# Patient Record
Sex: FEMALE | Race: WHITE | NOT HISPANIC OR LATINO
[De-identification: names, ages, dates, MRNs, and addresses within clinical notes are randomized per-mention and may not be internally consistent; named-entity substitution may affect disease eponyms.]

---

## 2021-07-29 PROBLEM — Z00.00 ENCOUNTER FOR PREVENTIVE HEALTH EXAMINATION: Status: ACTIVE | Noted: 2021-07-29

## 2021-08-04 ENCOUNTER — NON-APPOINTMENT (OUTPATIENT)
Age: 61
End: 2021-08-04

## 2021-08-04 ENCOUNTER — APPOINTMENT (OUTPATIENT)
Dept: ORTHOPEDIC SURGERY | Facility: CLINIC | Age: 61
End: 2021-08-04
Payer: COMMERCIAL

## 2021-08-04 VITALS — TEMPERATURE: 97.7 F

## 2021-08-04 DIAGNOSIS — Z86.79 PERSONAL HISTORY OF OTHER DISEASES OF THE CIRCULATORY SYSTEM: ICD-10-CM

## 2021-08-04 DIAGNOSIS — M17.12 UNILATERAL PRIMARY OSTEOARTHRITIS, LEFT KNEE: ICD-10-CM

## 2021-08-04 DIAGNOSIS — Z78.9 OTHER SPECIFIED HEALTH STATUS: ICD-10-CM

## 2021-08-04 DIAGNOSIS — M21.162 VARUS DEFORMITY, NOT ELSEWHERE CLASSIFIED, RIGHT KNEE: ICD-10-CM

## 2021-08-04 DIAGNOSIS — M21.161 VARUS DEFORMITY, NOT ELSEWHERE CLASSIFIED, RIGHT KNEE: ICD-10-CM

## 2021-08-04 DIAGNOSIS — M25.562 PAIN IN LEFT KNEE: ICD-10-CM

## 2021-08-04 DIAGNOSIS — M17.11 UNILATERAL PRIMARY OSTEOARTHRITIS, RIGHT KNEE: ICD-10-CM

## 2021-08-04 DIAGNOSIS — M25.561 PAIN IN RIGHT KNEE: ICD-10-CM

## 2021-08-04 PROCEDURE — 73564 X-RAY EXAM KNEE 4 OR MORE: CPT | Mod: RT

## 2021-08-04 PROCEDURE — 99203 OFFICE O/P NEW LOW 30 MIN: CPT

## 2021-08-04 RX ORDER — AMLODIPINE BESYLATE 5 MG/1
5 TABLET ORAL
Refills: 0 | Status: ACTIVE | COMMUNITY

## 2021-08-04 RX ORDER — MELOXICAM 15 MG/1
15 TABLET ORAL
Refills: 0 | Status: ACTIVE | COMMUNITY

## 2021-08-04 RX ORDER — IRBESARTAN AND HYDROCHLOROTHIAZIDE 300; 12.5 MG/1; MG/1
300-12.5 TABLET ORAL
Refills: 0 | Status: ACTIVE | COMMUNITY

## 2021-08-04 NOTE — PHYSICAL EXAM
[de-identified] : General appearance: well nourished and hydrated, pleasant, alert and oriented x 3, cooperative.\par Cardiovascular: no apparent abnormalities, no lower leg edema, no varicosities,\par Neurologic: sensation is normal, no muscle weakness in upper or lower extremities\par Dermatologic no apparent skin lesions, moist, warm, no rash.\par \par Left knee\par Inspection: no effusion or erythema.\par Wounds: none.\par Alignment: varus deformity \par Palpation: medial joint line tenderness \par ROM: 0-95  degrees \par Ligamentous laxity: all ligaments appear stable\par Muscle Test: good quad strength.\par \par Right knee\par Inspection: no effusion or erythema.\par Wounds: none.\par Alignment: varus \par Palpation: medial joint line tenderness \par ROM: 0-95 degrees\par Ligamentous laxity: all ligaments appear stable\par Muscle Test: good quad strength. [de-identified] : Radiographs done today AP lateral and skyline of both knees shows complete loss of medial joint space bilaterally with subchondral sclerosis and osteophyte formation. More moderate changes noted in lateral and PF compartments of both knees.

## 2021-08-04 NOTE — ASSESSMENT
[FreeTextEntry1] : 60 year old female presents to the office for bilateral knee discomfort and occasional pain. She describes pain primarily while negotiating stairs. But states that it is difficult to walk more then a block. She has been on Mobic as per her PCP. Shes uncertain if this has been helpful. She tried PT 6 years ago with no relief. She came in today because she was interested to know if shes a candidate for knee replacement surgery. I did explain to her that in my opinion she would benefit from knee replacement surgery. But i did not feel she was an ideal candidate at this time because of her BMI of 48.9. My recommendation was that she see the nutritionist at the [facility] bariat[facility] or work with her PCP. I did think she should remain on Mobic under supervision of her PCP. Additionally we discussed steroid or gel injections to supplement the Mobic. She was uncertain about receiving injections and said she would get back to us.